# Patient Record
Sex: FEMALE | Race: WHITE | NOT HISPANIC OR LATINO | URBAN - METROPOLITAN AREA
[De-identification: names, ages, dates, MRNs, and addresses within clinical notes are randomized per-mention and may not be internally consistent; named-entity substitution may affect disease eponyms.]

---

## 2017-09-23 ENCOUNTER — EMERGENCY (EMERGENCY)
Facility: HOSPITAL | Age: 32
LOS: 1 days | Discharge: HOME | End: 2017-09-23
Admitting: EMERGENCY MEDICINE
Payer: COMMERCIAL

## 2017-09-23 VITALS
RESPIRATION RATE: 18 BRPM | OXYGEN SATURATION: 98 % | SYSTOLIC BLOOD PRESSURE: 142 MMHG | HEART RATE: 89 BPM | DIASTOLIC BLOOD PRESSURE: 87 MMHG | TEMPERATURE: 98 F

## 2017-09-23 VITALS
RESPIRATION RATE: 17 BRPM | SYSTOLIC BLOOD PRESSURE: 135 MMHG | HEART RATE: 79 BPM | DIASTOLIC BLOOD PRESSURE: 75 MMHG | OXYGEN SATURATION: 98 %

## 2017-09-23 DIAGNOSIS — R42 DIZZINESS AND GIDDINESS: ICD-10-CM

## 2017-09-23 DIAGNOSIS — Z77.22 CONTACT WITH AND (SUSPECTED) EXPOSURE TO ENVIRONMENTAL TOBACCO SMOKE (ACUTE) (CHRONIC): ICD-10-CM

## 2017-09-23 LAB
PCO2 BLDV: 47 MMHG — SIGNIFICANT CHANGE UP (ref 41–51)
PH BLDV: 7.39 — SIGNIFICANT CHANGE UP (ref 7.32–7.43)
PO2 BLDV: 26 MMHG — LOW (ref 35–40)
SAO2 % BLDV: 47 % — SIGNIFICANT CHANGE UP

## 2017-09-23 PROCEDURE — 99284 EMERGENCY DEPT VISIT MOD MDM: CPT
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
## 2021-07-12 NOTE — ED ADULT NURSE NOTE - ALCOHOL PRE SCREEN (AUDIT - C)
Writer spoke with patient's aunt/Brandi & she will bring patient's Chas Simpler in the AM & she will also try to coordinate a refill of patient's Jardiance and bring that also. NP updated. Statement Selected

## 2023-02-06 NOTE — ED ADULT NURSE NOTE - NS ED PATIENT SAFETY CONCERN
PAST MEDICAL HISTORY:  ADHD (attention deficit hyperactivity disorder)     Anemia     Anxiety     Borderline personality disorder     Bradycardia     Cholecystitis S/P cholycystectomy    Chronic back pain     CKD (chronic kidney disease)     Depression     DM2 (diabetes mellitus, type 2)     Fractured coccyx     GERD (gastroesophageal reflux disease)     H/O appendicitis     HLD (hyperlipidemia)     HTN (hypertension)     Hypotension     Lymphocytosis     Mixed connective tissue disease     Nephrosclerosis     Obesity     Opiate dependence     PCOS (polycystic ovarian syndrome)     Polycystic ovarian syndrome     Suicide attempt by drug overdose 2019 Tylenol OD    Torn meniscus right knee    UTI (urinary tract infection)     
No

## 2023-03-02 NOTE — ED ADULT NURSE NOTE - CAS TRG [employer]AL [employer], MLM
Patent Hydroxyzine Pregnancy And Lactation Text: This medication is not safe during pregnancy and should not be taken. It is also excreted in breast milk and breast feeding isn't recommended.